# Patient Record
Sex: MALE | Race: WHITE | NOT HISPANIC OR LATINO | URBAN - METROPOLITAN AREA
[De-identification: names, ages, dates, MRNs, and addresses within clinical notes are randomized per-mention and may not be internally consistent; named-entity substitution may affect disease eponyms.]

---

## 2023-12-04 ENCOUNTER — LAB (OUTPATIENT)
Dept: INTERNAL MEDICINE | Facility: CLINIC | Age: 38
End: 2023-12-04
Payer: OTHER GOVERNMENT

## 2023-12-04 ENCOUNTER — OFFICE VISIT (OUTPATIENT)
Dept: INTERNAL MEDICINE | Facility: CLINIC | Age: 38
End: 2023-12-04
Payer: OTHER GOVERNMENT

## 2023-12-04 VITALS
HEART RATE: 85 BPM | SYSTOLIC BLOOD PRESSURE: 138 MMHG | TEMPERATURE: 97.5 F | WEIGHT: 190.4 LBS | HEIGHT: 73 IN | BODY MASS INDEX: 25.23 KG/M2 | DIASTOLIC BLOOD PRESSURE: 68 MMHG | OXYGEN SATURATION: 97 %

## 2023-12-04 DIAGNOSIS — H43.399 VITREOUS FLOATERS, UNSPECIFIED LATERALITY: ICD-10-CM

## 2023-12-04 DIAGNOSIS — Z00.00 WELL ADULT EXAM: ICD-10-CM

## 2023-12-04 DIAGNOSIS — Z00.00 WELL ADULT EXAM: Primary | ICD-10-CM

## 2023-12-04 DIAGNOSIS — J30.89 ALLERGIC RHINITIS DUE TO OTHER ALLERGIC TRIGGER, UNSPECIFIED SEASONALITY: ICD-10-CM

## 2023-12-04 DIAGNOSIS — H04.123 BILATERAL DRY EYES: ICD-10-CM

## 2023-12-04 DIAGNOSIS — J30.2 SEASONAL ALLERGIES: ICD-10-CM

## 2023-12-04 LAB
ALBUMIN SERPL-MCNC: 5.1 G/DL (ref 3.5–5.2)
ALBUMIN/GLOB SERPL: 2 G/DL
ALP SERPL-CCNC: 59 U/L (ref 39–117)
ALT SERPL W P-5'-P-CCNC: 17 U/L (ref 1–41)
ANION GAP SERPL CALCULATED.3IONS-SCNC: 11 MMOL/L (ref 5–15)
AST SERPL-CCNC: 19 U/L (ref 1–40)
BILIRUB SERPL-MCNC: 1.1 MG/DL (ref 0–1.2)
BUN SERPL-MCNC: 13 MG/DL (ref 6–20)
BUN/CREAT SERPL: 12.9 (ref 7–25)
CALCIUM SPEC-SCNC: 10 MG/DL (ref 8.6–10.5)
CHLORIDE SERPL-SCNC: 104 MMOL/L (ref 98–107)
CHOLEST SERPL-MCNC: 211 MG/DL (ref 0–200)
CO2 SERPL-SCNC: 24 MMOL/L (ref 22–29)
CREAT SERPL-MCNC: 1.01 MG/DL (ref 0.76–1.27)
EGFRCR SERPLBLD CKD-EPI 2021: 96.4 ML/MIN/1.73
EXPIRATION DATE: NORMAL
GLOBULIN UR ELPH-MCNC: 2.6 GM/DL
GLUCOSE SERPL-MCNC: 82 MG/DL (ref 65–99)
HBA1C MFR BLD: 5.1 % (ref 4.5–5.7)
HDLC SERPL-MCNC: 47 MG/DL (ref 40–60)
LDLC SERPL CALC-MCNC: 144 MG/DL (ref 0–100)
LDLC/HDLC SERPL: 3.01 {RATIO}
Lab: NORMAL
POTASSIUM SERPL-SCNC: 4.5 MMOL/L (ref 3.5–5.2)
PROT SERPL-MCNC: 7.7 G/DL (ref 6–8.5)
SODIUM SERPL-SCNC: 139 MMOL/L (ref 136–145)
TRIGL SERPL-MCNC: 112 MG/DL (ref 0–150)
VLDLC SERPL-MCNC: 20 MG/DL (ref 5–40)

## 2023-12-04 PROCEDURE — 36415 COLL VENOUS BLD VENIPUNCTURE: CPT | Performed by: STUDENT IN AN ORGANIZED HEALTH CARE EDUCATION/TRAINING PROGRAM

## 2023-12-04 PROCEDURE — 80061 LIPID PANEL: CPT | Performed by: STUDENT IN AN ORGANIZED HEALTH CARE EDUCATION/TRAINING PROGRAM

## 2023-12-04 PROCEDURE — 80053 COMPREHEN METABOLIC PANEL: CPT | Performed by: STUDENT IN AN ORGANIZED HEALTH CARE EDUCATION/TRAINING PROGRAM

## 2023-12-04 RX ORDER — LEVOCETIRIZINE DIHYDROCHLORIDE 5 MG/1
5 TABLET, FILM COATED ORAL EVERY EVENING
COMMUNITY

## 2023-12-04 NOTE — PROGRESS NOTES
"    Office Note     Name: Kyle Zaldivar    : 1985     MRN: [id number]     Chief Complaint  Annual Exam (Needs referral to optomologist )    Subjective     History of Present Illness:  Kyle Zaldivar is a 38 y.o. male who presents today for     Here for annual check up.    Current chronic problems    Floaters/Dry eyes  2022 had PRK done in [place], [place].  Would get floaters, in his visual field  Worsened in the winter, scratchy,      Seasonal allergies  Occurs worse in the spring and fall.  Medication relief Xyzal for allergies\ Pataday for eye symptoms      Active duty        Review of Systems:   Review of Systems   All other systems reviewed and are negative.      Past Medical History: History reviewed. No pertinent past medical history.    Past Surgical History:   Past Surgical History:   Procedure Laterality Date    EYE SURGERY Bilateral        Family History:   Family History   Problem Relation Age of Onset    Congenital heart disease Sister        Social History:   Social History     Socioeconomic History    Marital status:    Tobacco Use    Smoking status: Never    Smokeless tobacco: Never   Vaping Use    Vaping Use: Never used   Substance and Sexual Activity    Alcohol use: Yes     Comment: occ    Drug use: Never    Sexual activity: Yes     Partners: Female       Immunizations:   There is no immunization history on file for this patient.     Medications:     Current Outpatient Medications:     levocetirizine (Xyzal Allergy 24HR) 5 MG tablet, Take 1 tablet by mouth Every Evening., Disp: , Rfl:     Allergies:   Allergies   Allergen Reactions    Prednisone Other (See Comments)     The steroid eye drops        Objective     Vital Signs  /68   Pulse 85   Temp 97.5 °F (36.4 °C) (Temporal)   Ht 185 cm (72.84\")   Wt 86.4 kg (190 lb 6.4 oz)   SpO2 97%   BMI 25.23 kg/m²   Estimated body mass index is 25.23 kg/m² as calculated from the following:    Height as of this encounter: " "185 cm (72.84\").    Weight as of this encounter: 86.4 kg (190 lb 6.4 oz).          Physical Exam  Constitutional:       Appearance: Normal appearance.   Cardiovascular:      Rate and Rhythm: Normal rate and regular rhythm.      Pulses: Normal pulses.      Heart sounds: Normal heart sounds.   Pulmonary:      Effort: Pulmonary effort is normal.      Breath sounds: Normal breath sounds.   Neurological:      Mental Status: He is alert.          Result Review :                  Assessment and Plan     1. Well adult exam  The following were discussed at today's wellness visit today: preventaitve: Nutrition, Physical activity, Healthy weight, Immunizations, and Screenings    - Comprehensive metabolic panel; Future  - Lipid panel; Future  - Hemoglobin A1c; Future  - POC Glycosylated Hemoglobin (Hb A1C)    2. Vitreous floaters, unspecified laterality    - Ambulatory Referral to Ophthalmology  - Ambulatory Referral to Allergy    3. Bilateral dry eyes    - Ambulatory Referral to Ophthalmology  - Ambulatory Referral to Allergy    4. Seasonal allergies    - Ambulatory Referral to Ophthalmology  - Ambulatory Referral to Allergy    5. Allergic rhinitis due to other allergic trigger, unspecified seasonality    - Ambulatory Referral to Ophthalmology  - Ambulatory Referral to Allergy       Follow Up  No follow-ups on file.    MD [facility] [address]  [facility]  [address]  [address] [id number] [phone]    "

## 2023-12-12 ENCOUNTER — TELEPHONE (OUTPATIENT)
Dept: INTERNAL MEDICINE | Facility: CLINIC | Age: 38
End: 2023-12-12
Payer: OTHER GOVERNMENT

## 2023-12-12 NOTE — TELEPHONE ENCOUNTER
----- Message from Alvarado Penn MD sent at 12/11/2023 10:32 PM EST -----  Please let patient know  Reviewed his labs, his kidney, electro lyres and liver enzymes are normal.  His LDL cholesterol is elevated. It is at 144, ideal range is < 100. There is no need for medication, we will recheck this at annual physical next year.     I would recommend diet and lifestyle modifications which include avoidance of foods that are high in fat, sugar, and carbohydrates.  Focus on trying to eat leafy green vegetables, foods that are high in fiber, and lean meats.  ry to get at least 150 minutes of cardiovascular exercise weekly.

## 2023-12-12 NOTE — TELEPHONE ENCOUNTER
Calledp t lft vm with cb number to inform of lab results     OK FOR HUB TO RELAY MESSAGE BELOW     --- Message from Alvarado Penn MD sent at 12/11/2023 10:32 PM EST -----  Please let patient know  Reviewed his labs, his kidney, electro lyres and liver enzymes are normal.  His LDL cholesterol is elevated. It is at 144, ideal range is < 100. There is no need for medication, we will recheck this at annual physical next year.      I would recommend diet and lifestyle modifications which include avoidance of foods that are high in fat, sugar, and carbohydrates.  Focus on trying to eat leafy green vegetables, foods that are high in fiber, and lean meats.  ry to get at least 150 minutes of cardiovascular exercise weekly.

## 2023-12-12 NOTE — TELEPHONE ENCOUNTER
Name: Kyle Zaldivar      Relationship: Self            HUB PROVIDED THE RELAY MESSAGE FROM THE OFFICE      PATIENT: VOICED UNDERSTANDING AND HAS NO FURTHER QUESTIONS AT THIS TIME    ADDITIONAL INFORMATION:

## 2024-12-31 ENCOUNTER — TELEPHONE (OUTPATIENT)
Dept: INTERNAL MEDICINE | Facility: CLINIC | Age: 39
End: 2024-12-31
Payer: OTHER GOVERNMENT

## 2024-12-31 NOTE — TELEPHONE ENCOUNTER
Patient was seen at Urgent Care today 12/31/24 and was instructed to call PCP to see about getting referred to ENT for     Left ear  Intermittent tinnitus, dizziness and balance issues.    Is this something he needs to be seen for or can a referral be placed?

## 2025-01-02 DIAGNOSIS — H93.19 TINNITUS, UNSPECIFIED LATERALITY: Primary | ICD-10-CM

## 2025-01-02 DIAGNOSIS — R42 VERTIGO: ICD-10-CM

## 2025-01-02 NOTE — TELEPHONE ENCOUNTER
HUB to relay:  Please let patient know I put in referral for ENT, they will call him to schedule an appointment

## 2025-02-27 ENCOUNTER — TELEPHONE (OUTPATIENT)
Dept: INTERNAL MEDICINE | Facility: CLINIC | Age: 40
End: 2025-02-27

## 2025-02-27 NOTE — TELEPHONE ENCOUNTER
Pt called to let us know that the ENT / Allergy referral told him that they Did not have Referral number on the referral. Needs resent with this.